# Patient Record
Sex: MALE | Race: WHITE
[De-identification: names, ages, dates, MRNs, and addresses within clinical notes are randomized per-mention and may not be internally consistent; named-entity substitution may affect disease eponyms.]

---

## 2018-04-18 ENCOUNTER — HOSPITAL ENCOUNTER (EMERGENCY)
Dept: HOSPITAL 68 - ERH | Age: 73
LOS: 1 days | End: 2018-04-19
Payer: COMMERCIAL

## 2018-04-18 VITALS — BODY MASS INDEX: 29.55 KG/M2 | HEIGHT: 68 IN | WEIGHT: 195 LBS

## 2018-04-18 VITALS — DIASTOLIC BLOOD PRESSURE: 90 MMHG | SYSTOLIC BLOOD PRESSURE: 172 MMHG

## 2018-04-18 DIAGNOSIS — R07.89: Primary | ICD-10-CM

## 2018-04-18 LAB
ABSOLUTE GRANULOCYTE CT: 7 /CUMM (ref 1.4–6.5)
BASOPHILS # BLD: 0 /CUMM (ref 0–0.2)
BASOPHILS NFR BLD: 0.4 % (ref 0–2)
EOSINOPHIL # BLD: 0.3 /CUMM (ref 0–0.7)
EOSINOPHIL NFR BLD: 2.8 % (ref 0–5)
ERYTHROCYTE [DISTWIDTH] IN BLOOD BY AUTOMATED COUNT: 13.6 % (ref 11.5–14.5)
GRANULOCYTES NFR BLD: 67.9 % (ref 42.2–75.2)
HCT VFR BLD CALC: 46.3 % (ref 42–52)
LYMPHOCYTES # BLD: 2.1 /CUMM (ref 1.2–3.4)
MCH RBC QN AUTO: 30.6 PG (ref 27–31)
MCHC RBC AUTO-ENTMCNC: 33.6 G/DL (ref 33–37)
MCV RBC AUTO: 91 FL (ref 80–94)
MONOCYTES # BLD: 0.8 /CUMM (ref 0.1–0.6)
PLATELET # BLD: 244 /CUMM (ref 130–400)
PMV BLD AUTO: 8.6 FL (ref 7.4–10.4)
RED BLOOD CELL CT: 5.09 /CUMM (ref 4.7–6.1)
WBC # BLD AUTO: 10.2 /CUMM (ref 4.8–10.8)

## 2018-04-18 NOTE — ED CARDIAC/CP/PALPITATIONS
History of Present Illness
 
General
Chief Complaint: Chest Pain
Stated Complaint: " CHEST PAIN ALLAFTERNOON, /80"
Source: patient, family, old records
Exam Limitations: no limitations
 
Vital Signs & Intake/Output
Vital Signs & Intake/Output
 Vital Signs
 
 
Date Time Temp Pulse Resp B/P B/P Pulse O2 O2 Flow FiO2
 
     Mean Ox Delivery Rate 
 
 2319  70 20 172/90  96 Room Air  
 
 2114 97.9 83 20 181/85  97 Room Air  
 
 
 ED Intake and Output
 
 
  0000  1200
 
Intake Total  
 
Output Total  
 
Balance  
 
   
 
Patient 195 lb 
 
Weight  
 
 
 
Allergies
Coded Allergies:
No Known Allergies (18)
 
Reconcile Medications
Hydrochlorothiazide 25 MG TABLET   1 TAB PO DAILY htn
 
Triage Note:
PT TO TRIAGE C/O CP SINCE THIS AFTERNOON S/P
EATING TUNA FISH SANDWICH, BUT PAIN HAS PERSISTED.
DENIES RADIATING PAIN, DENIES SOB. PT ALSO C/O
HIGH BLOOD PRESSURE AT HOME. /85 IN TRIAGE.
DENIES CARDIAC HX.
Triage Nurses Notes Reviewed? yes
Onset: Abrupt
Duration: day(s): (1), constant
Timing: recent history
Quality/Severity: mild, aching, burning
Location: central
Radiation: no radiation
Activities at Onset: eating
Nitro Today/Relief: no nitro taken today
Aspirin Today: no aspirin today
Associated Symptoms: denies
HPI:
 
73-year-old male presents to the ER for evaluation complaining of central 
nonradiating chest pain associated with frequent belching since approximately 1:
00 this afternoon after eating a tuna fish sandwich.  He denies radiation of 
pain shortness of breath or pain with inspiration.  No nausea no vomiting.  He 
is not taken anything for his symptoms.  He took his blood pressure was elevated
and presents to the ER.  He did have soup for dinner which she states after 
eating soup made the symptoms worse.  No back pain and arm pain abdominal pain. 
He denies tobacco or alcohol use.  No history of cardiac disease.  Symptoms are 
not worse with exertion.
 
 
(Prabha CHANG,Slim)
 
Past History
 
Travel History
Traveled to [place] past 21 day No
 
Medical History
Any Pertinent Medical History? none
Neurological: NONE
EENT: NONE
Cardiovascular: NONE
Respiratory: NONE
Gastrointestinal: NONE
Hepatic: NONE
Renal: NONE
Musculoskeletal: NONE
Psychiatric: NONE
Endocrine: NONE
Blood Disorders: NONE
Cancer(s): NONE
GYN/Reproductive: NONE
 
Surgical History
Surgical History: intestinal perforation
 
Psychosocial History
What is your primary language English
Tobacco Use: Never used
ETOH Use: occasional use
 
Family History
Hx Contributory? No
(Slim Morales)
 
Review of Systems
 
Review of Systems
Constitutional:
Reports: see HPI. 
Comments
Review of systems: See HPI, All other systems negative.
Constitutional, no chills no fever,
HEENT:  no sore throat no congestion
Cardiovascular: chest pain , no palpitation
Skin:  no rashes, no change in skin
Respiratory: No dyspnea no cough no  sputum no  hemoptysis
GI: No nausea no vomiting, no diarrhea, no bloating/constipation
Muscle skeletal: No joint pain, no back pain, no neck pain,
Neurologic: , no headache
Heme/endocrine: No bruising
Immunology: No lymphadenopathy
(Slim Morales)
 
Physical Exam
 
Physical Exam
General Appearance: well developed/nourished, no apparent distress, alert, awake
, comfortable
Cardiovascular: regular rate/rhythm
Comments:
Well-developed well-nourished person in no acute distress
HEENT: Normal EENT exam; PERRL, EOMI. HEAD is atraumatic. moist mucous 
membranes.  
Neck: Supple, normal range of motion
Back: Nontender, no CVA tenderness. Full range of motion
Cardiovascular: Regular rate and rhythms no murmurs 
Respiratory: Chest nontender.There were no bony deformities, no asymmetry. No 
respiratory distress.  Patient speaking in full complete sentences. Breath 
sounds clear to auscultation bilaterally: NO W/R/R
Abdomen: Soft, nontender nondistended, no appreciable organomegaly. Normal bowel
sounds. No rebound/guarding, No ascites.
Extremity: No edema, full range of motion of extremities
Neuro: Alert oriented x3, motor sensory normal, There were no obvious focal 
neurologic abnormalities.
Skin: No appreciable rash on exposed skin, skin is warm and dry.
Psych: Mood and affect is normal, memory and judgment is normal.
 
Core Measures
ACS in differential dx? Yes
CVA/TIA Diagnosis No
Sepsis Present: No
Sepsis Focused Exam Completed? No
(Slim Morales)
 
Progress
Differential Diagnosis: AMI, atrial fibrillation, myocarditis, pericarditis, 
pneumonia, pneumothorax, PUD/GERD, unstable angina
Plan of Care:
 Orders
 
 
Procedure Date/time Status
 
TROPONIN LEVEL 0 Complete
 
EKG 30 Active
 
TROPONIN LEVEL 2124 Complete
 
COMPREHENSIVE METABOLIC PANEL 2124 Complete
 
CBC WITHOUT DIFFERENTIAL 2124 Complete
 
EKG 2113 Active
 
 
 Laboratory Tests
 
 
 
18 0017:
Troponin I < 0.01
 
18:
Anion Gap 10, Estimated GFR 59  L, BUN/Creatinine Ratio 13.3, Glucose 144  H, 
Calcium 9.3, Total Bilirubin 0.5, AST 21, ALT 24, Alkaline Phosphatase 43, 
Troponin I < 0.01, Total Protein 6.6, Albumin 3.6, Globulin 3.0, Albumin/
Globulin Ratio 1.2, CBC w Diff NO MAN DIFF REQ, RBC 5.09, MCV 91.0, MCH 30.6, 
MCHC 33.6, RDW 13.6, MPV 8.6, Gran % 67.9, Lymphocytes % 20.7, Monocytes % 8.2, 
Eosinophils % 2.8, Basophils % 0.4, Absolute Granulocytes 7.0  H, Absolute 
Lymphocytes 2.1, Absolute Monocytes 0.8  H, Absolute Eosinophils 0.3, Absolute 
Basophils 0
Patient medicated with GI cocktail labs ordered old records reviewed
 
 
Review evaluation patient reports symptoms have migrated to the epigastric 
region GI cocktail however it is improved discussed with the plan of care will 
get a repeat troponin EKG at 12:30 otherwise resting in no acute distress at 
this time.  Patient frequently belching in the room
 
 
110 I discussed with the patient and his repeat labs he denies any chest pain at
this time, he continues to belch discussed with him plan of care he will follow-
up with cardiology primary care prescription for hydrochlorothiazide will be 
provided.
 
Diagnostic Imaging:
Viewed by Me: Radiology Read.  Discussed w/RAD: Radiology Read. 
Radiology Impression: PATIENT: JAY MOSLEY  MEDICAL RECORD NO: [id number] PRESENT
AGE: 73  PATIENT ACCOUNT NO: [id number] : 45  LOCATION: [facility] ORDERING 
PHYSICIAN: Slim CHANG     SERVICE DATE: 7 EXAM TYPE: RAD - XRY-
PORTABLE CHEST XRAY EXAMINATION: XR PORTABLE CHEST CLINICAL INFORMATION: Chest 
pain COMPARISON: None TECHNIQUE: Portable frontal view of the chest was 
obtained. FINDINGS: The lungs are well expanded. There is no focal consolidation
, edema, or effusion. No pneumothorax. The cardiomediastinal silhouette is 
within normal limits. No acute osseous abnormality. IMPRESSION: No acute 
pulmonary findings. DICTATED BY: Analisa LOCKETT,Jim  DATE/TIME DICTATED:2337 :RAD.FORDE  DATE/TIME TRANSCRIBED:18 
CONFIDENTIAL, DO NOT COPY WITHOUT APPROPRIATE AUTHORIZATION.  <Electronically 
signed in Other Vendor System>                                                  
                                     SIGNED BY: Analisa LOCKETT,Jim 18 
[id number]
Initial ED EKG: normal intervals, normal p-waves, normal QRS complex, normal 
sinus rhythm
Repeat EKG: unchanged
Rhythm Strip: normal sinus rhythm
(Slim Morales)
 
Departure
 
Departure
Time of Disposition: 109
Disposition: HOME OR SELF CARE
Condition: Stable
Clinical Impression
Primary Impression: Atypical chest pain
Referrals:
Funmi LOCKETT,Hiram Esteban MD,Jorge AG (PCP/Family)
 
Additional Instructions:
Hydrochlorothiazide as directed. Follow-up with cardiologist Dr. pederson.  [place]
diet clear liquids advance as tolerated.  Return to the emergency room anytime 
sooner with any concerns.
Departure Forms:
Customer Survey
General Discharge Information
Prescriptions:
Current Visit Scripts
Hydrochlorothiazide 1 TAB PO DAILY  
     #30 TAB 
 
 
(Slim Morales)
 
PA/NP Co-Sign Statement
Statement:
ED Attending supervision documentation-
 
[X] I saw and evaluated the patient. I have also reviewed all the pertinent lab 
results and diagnostic results. I agree with the findings and the plan of care 
as documented in the PA's/NP's documentation. 
 
[X] I have reviewed the ED Record and agree with the PA's/NP's documentation.
 
[] Additions or exceptions (if any) to the PAs/NP's note and plan are 
summarized below:
[]
 
(Meghan LOCKETT,Tip GOMEZ)
 
Critical Care Note
 
Critical Care Note
Critical Care Time: non-applicable
(Slim Morales)

## 2018-04-18 NOTE — RADIOLOGY REPORT
EXAMINATION:
XR PORTABLE CHEST
 
CLINICAL INFORMATION:
Chest pain
 
COMPARISON:
None
 
TECHNIQUE:
Portable frontal view of the chest was obtained.
 
FINDINGS:
The lungs are well expanded. There is no focal consolidation, edema, or
effusion. No pneumothorax. The cardiomediastinal silhouette is within normal
limits. No acute osseous abnormality.
 
IMPRESSION:
No acute pulmonary findings.